# Patient Record
Sex: FEMALE
[De-identification: names, ages, dates, MRNs, and addresses within clinical notes are randomized per-mention and may not be internally consistent; named-entity substitution may affect disease eponyms.]

---

## 2023-06-28 PROBLEM — Z00.00 ENCOUNTER FOR PREVENTIVE HEALTH EXAMINATION: Status: ACTIVE | Noted: 2023-06-28

## 2023-06-30 ENCOUNTER — APPOINTMENT (OUTPATIENT)
Dept: PLASTIC SURGERY | Facility: CLINIC | Age: 38
End: 2023-06-30
Payer: COMMERCIAL

## 2023-06-30 VITALS — WEIGHT: 230 LBS | HEIGHT: 68 IN | BODY MASS INDEX: 34.86 KG/M2

## 2023-06-30 DIAGNOSIS — Z78.9 OTHER SPECIFIED HEALTH STATUS: ICD-10-CM

## 2023-06-30 DIAGNOSIS — N62 HYPERTROPHY OF BREAST: ICD-10-CM

## 2023-06-30 PROCEDURE — 99203 OFFICE O/P NEW LOW 30 MIN: CPT

## 2023-07-01 NOTE — REASON FOR VISIT
[Initial Evaluation] : an initial evaluation [FreeTextEntry1] : self-referred; PMD Marialuisa Luther

## 2023-07-01 NOTE — PHYSICAL EXAM
[NI] : Normal [de-identified] : no gross truncal asymmetry\par bilateral shoulder grooving present [de-identified] : softly protuberant [de-identified] : Left breast: no palpable masses, nipple retraction or discharge.\par Right breast: no palpable masses, nipple retraction or discharge.\par minimal bilateral axillary rolls\par Chest: no trunk deformities\par Bilateral macromastia (right < Left 50 g) with Grade II ptosis with no active inframammary fold rash, superior pole deflation\par Anticipated volume of resection of EACH breast based on CLINICAL ASSESSMENT: \par Anticipated volume of resection of EACH breast based on BSA: 840 g\par \par Breast measurements (standing, cm):\par R SN-Nipple: 32\par R Nipple-IMF: 17.5\par R Nipple - midsternum: 10\par R NAC diameter: 5.6\par IMF position asymmetrical, left 2 cm lower than right breast\par L SN-Nipple: 33\par L Nipple-IMF: 18\par L Nipple - midsternum: 10.5\par L NAC diameter: 6\par

## 2023-07-01 NOTE — ASSESSMENT
[FreeTextEntry1] : 36 yo obese woman with symptomatic bilateral macromastia with Grade II ptosis.\par \par -Patient not yet a suitable candidate for breast reduction\par \par -Recommend pre-op weight loss to BMI 30\par -Reviewed improved post-op cosmesis and surgical risk reduction with pre-op weight loss\par -Nutrition referral given\par -Baseline pre-op mammogram and Breast US\par -All questions were answered\par -Follow up after weight loss\par \par -Photos were taken.\par \par

## 2023-08-07 ENCOUNTER — APPOINTMENT (OUTPATIENT)
Dept: PULMONOLOGY | Facility: CLINIC | Age: 38
End: 2023-08-07
Payer: COMMERCIAL

## 2023-08-07 VITALS
HEART RATE: 92 BPM | HEIGHT: 68 IN | WEIGHT: 230 LBS | BODY MASS INDEX: 34.86 KG/M2 | RESPIRATION RATE: 14 BRPM | DIASTOLIC BLOOD PRESSURE: 70 MMHG | SYSTOLIC BLOOD PRESSURE: 122 MMHG | OXYGEN SATURATION: 97 %

## 2023-08-07 DIAGNOSIS — Z82.5 FAMILY HISTORY OF ASTHMA AND OTHER CHRONIC LOWER RESPIRATORY DISEASES: ICD-10-CM

## 2023-08-07 DIAGNOSIS — K21.9 GASTRO-ESOPHAGEAL REFLUX DISEASE W/OUT ESOPHAGITIS: ICD-10-CM

## 2023-08-07 DIAGNOSIS — Z83.49 FAMILY HISTORY OF OTHER ENDOCRINE, NUTRITIONAL AND METABOLIC DISEASES: ICD-10-CM

## 2023-08-07 DIAGNOSIS — J45.30 MILD PERSISTENT ASTHMA, UNCOMPLICATED: ICD-10-CM

## 2023-08-07 PROCEDURE — 99204 OFFICE O/P NEW MOD 45 MIN: CPT | Mod: 25

## 2023-08-07 PROCEDURE — 94010 BREATHING CAPACITY TEST: CPT

## 2023-08-07 NOTE — HISTORY OF PRESENT ILLNESS
[TextBox_4] : Ms. GODFREY is a 37 year woman with a medical history significant for childhood asthma, and recently diagnosed GERD presenting today to the clinic for chronic cough since February.     had COVID, sinus infection, and pink eye, cough had lingered a long time More recently becoming productive having post-nasal gtt feelings feels like cough is so bad she's tearing up, keeping awake at night, constantly  takes [place] w/ some response Getting worse in past few months constant clearing throat upper throat, itchy taking PPI for last 1 month EGD done, showed acid reflux minimal. Had previously been on benzonatate w/o response was on flonase in Feb also taking last 2 weeks BID taking claritin/zyrtec for last 2 months too  Occupational Hx: [employer], denies exposures

## 2023-08-07 NOTE — PROCEDURE
[FreeTextEntry1] : In-office spirometry performed, flow-volume loops are normal, spirometry within normal limits.

## 2023-09-13 ENCOUNTER — APPOINTMENT (OUTPATIENT)
Dept: PULMONOLOGY | Facility: CLINIC | Age: 38
End: 2023-09-13

## 2023-09-21 ENCOUNTER — APPOINTMENT (OUTPATIENT)
Dept: PULMONOLOGY | Facility: CLINIC | Age: 38
End: 2023-09-21
Payer: COMMERCIAL

## 2023-09-21 VITALS
HEIGHT: 68 IN | WEIGHT: 230 LBS | BODY MASS INDEX: 34.86 KG/M2 | RESPIRATION RATE: 14 BRPM | HEART RATE: 88 BPM | OXYGEN SATURATION: 97 % | SYSTOLIC BLOOD PRESSURE: 120 MMHG | DIASTOLIC BLOOD PRESSURE: 80 MMHG

## 2023-09-21 PROCEDURE — 99213 OFFICE O/P EST LOW 20 MIN: CPT

## 2023-11-03 ENCOUNTER — APPOINTMENT (OUTPATIENT)
Dept: PLASTIC SURGERY | Facility: CLINIC | Age: 38
End: 2023-11-03

## 2023-12-07 ENCOUNTER — APPOINTMENT (OUTPATIENT)
Dept: RHEUMATOLOGY | Facility: CLINIC | Age: 38
End: 2023-12-07
Payer: COMMERCIAL

## 2023-12-07 VITALS
TEMPERATURE: 98.5 F | DIASTOLIC BLOOD PRESSURE: 75 MMHG | SYSTOLIC BLOOD PRESSURE: 117 MMHG | WEIGHT: 240 LBS | HEART RATE: 65 BPM | BODY MASS INDEX: 36.37 KG/M2 | HEIGHT: 68 IN

## 2023-12-07 DIAGNOSIS — R76.8 OTHER SPECIFIED ABNORMAL IMMUNOLOGICAL FINDINGS IN SERUM: ICD-10-CM

## 2023-12-07 PROCEDURE — 99204 OFFICE O/P NEW MOD 45 MIN: CPT

## 2023-12-07 RX ORDER — FOLIC ACID 1 MG/1
1 TABLET ORAL
Refills: 0 | Status: ACTIVE | COMMUNITY

## 2023-12-28 ENCOUNTER — APPOINTMENT (OUTPATIENT)
Dept: PULMONOLOGY | Facility: CLINIC | Age: 38
End: 2023-12-28
Payer: COMMERCIAL

## 2023-12-28 VITALS
SYSTOLIC BLOOD PRESSURE: 116 MMHG | BODY MASS INDEX: 36.37 KG/M2 | HEART RATE: 83 BPM | HEIGHT: 68 IN | DIASTOLIC BLOOD PRESSURE: 70 MMHG | OXYGEN SATURATION: 98 % | WEIGHT: 240 LBS

## 2023-12-28 DIAGNOSIS — R05.3 CHRONIC COUGH: ICD-10-CM

## 2023-12-28 DIAGNOSIS — J45.991 COUGH VARIANT ASTHMA: ICD-10-CM

## 2023-12-28 PROCEDURE — 99214 OFFICE O/P EST MOD 30 MIN: CPT

## 2023-12-28 NOTE — HISTORY OF PRESENT ILLNESS
[TextBox_4] : Ms. GODFREY is a 37 year woman with a medical history significant for childhood asthma, and recently diagnosed GERD presenting today to the clinic for chronic cough since February.     had COVID, sinus infection, and pink eye, cough had lingered a long time More recently becoming productive having post-nasal gtt feelings feels like cough is so bad she's tearing up, keeping awake at night, constantly  takes [place] w/ some response Getting worse in past few months constant clearing throat upper throat, itchy taking PPI for last 1 month EGD done, showed acid reflux minimal. Had previously been on benzonatate w/o response was on flonase in Feb also taking last 2 weeks BID taking claritin/zyrtec for last 2 months too Dramatic improvement after starting treatment with prednisone and symbicort  Occupational Hx: [employer], denies exposures   Interval history: Saw ENT with [facility], demonstrating no significant upper airway symptoms Her PCP found that she had postive MARU and was evaluated by rheum, no clear diagnosis there Symptoms have worsened again, but the patient is not taking any medication currently except some flonase at times

## 2024-01-09 RX ORDER — BUDESONIDE AND FORMOTEROL FUMARATE DIHYDRATE 80; 4.5 UG/1; UG/1
80-4.5 AEROSOL RESPIRATORY (INHALATION) TWICE DAILY
Qty: 1 | Refills: 4 | Status: DISCONTINUED | COMMUNITY
Start: 2023-12-28 | End: 2024-01-09

## 2024-01-11 ENCOUNTER — APPOINTMENT (OUTPATIENT)
Dept: PULMONOLOGY | Facility: HOSPITAL | Age: 39
End: 2024-01-11

## 2024-01-18 ENCOUNTER — APPOINTMENT (OUTPATIENT)
Dept: PULMONOLOGY | Facility: CLINIC | Age: 39
End: 2024-01-18
Payer: COMMERCIAL

## 2024-01-18 PROCEDURE — 94010 BREATHING CAPACITY TEST: CPT

## 2024-01-18 PROCEDURE — 94727 GAS DIL/WSHOT DETER LNG VOL: CPT

## 2024-01-18 PROCEDURE — 94729 DIFFUSING CAPACITY: CPT

## 2024-03-07 ENCOUNTER — APPOINTMENT (OUTPATIENT)
Dept: PULMONOLOGY | Facility: CLINIC | Age: 39
End: 2024-03-07
Payer: COMMERCIAL

## 2024-03-07 PROCEDURE — 99442: CPT

## 2024-03-07 RX ORDER — FLUTICASONE PROPIONATE AND SALMETEROL 250; 50 UG/1; UG/1
250-50 POWDER RESPIRATORY (INHALATION)
Qty: 1 | Refills: 5 | Status: ACTIVE | COMMUNITY
Start: 2024-01-09 | End: 1900-01-01

## 2024-03-07 RX ORDER — PREDNISONE 20 MG/1
20 TABLET ORAL DAILY
Qty: 10 | Refills: 0 | Status: COMPLETED | COMMUNITY
Start: 2023-12-28 | End: 2024-03-07